# Patient Record
Sex: FEMALE | Race: WHITE | Employment: FULL TIME | URBAN - METROPOLITAN AREA
[De-identification: names, ages, dates, MRNs, and addresses within clinical notes are randomized per-mention and may not be internally consistent; named-entity substitution may affect disease eponyms.]

---

## 2022-12-29 ENCOUNTER — HOSPITAL ENCOUNTER (EMERGENCY)
Age: 27
Discharge: HOME | End: 2022-12-29
Attending: EMERGENCY MEDICINE
Payer: OTHER GOVERNMENT

## 2022-12-29 VITALS
SYSTOLIC BLOOD PRESSURE: 137 MMHG | RESPIRATION RATE: 12 BRPM | WEIGHT: 160 LBS | OXYGEN SATURATION: 98 % | HEIGHT: 66 IN | TEMPERATURE: 102.8 F | BODY MASS INDEX: 25.71 KG/M2 | DIASTOLIC BLOOD PRESSURE: 84 MMHG | HEART RATE: 100 BPM

## 2022-12-29 DIAGNOSIS — R59.1 LYMPHADENOPATHY: ICD-10-CM

## 2022-12-29 DIAGNOSIS — R50.9 FEVER, UNSPECIFIED FEVER CAUSE: Primary | ICD-10-CM

## 2022-12-29 LAB
FLU A ANTIGEN: NEGATIVE
FLU B ANTIGEN: NEGATIVE
MONONUCLEOSIS SCREEN: NEGATIVE

## 2022-12-29 PROCEDURE — 6360000002 HC RX W HCPCS: Performed by: EMERGENCY MEDICINE

## 2022-12-29 PROCEDURE — 99283 EMERGENCY DEPT VISIT LOW MDM: CPT

## 2022-12-29 PROCEDURE — 87804 INFLUENZA ASSAY W/OPTIC: CPT

## 2022-12-29 PROCEDURE — 6370000000 HC RX 637 (ALT 250 FOR IP): Performed by: EMERGENCY MEDICINE

## 2022-12-29 PROCEDURE — 86308 HETEROPHILE ANTIBODY SCREEN: CPT

## 2022-12-29 RX ORDER — DEXAMETHASONE SODIUM PHOSPHATE 10 MG/ML
10 INJECTION, SOLUTION INTRAMUSCULAR; INTRAVENOUS ONCE
Status: COMPLETED | OUTPATIENT
Start: 2022-12-29 | End: 2022-12-29

## 2022-12-29 RX ORDER — IBUPROFEN 800 MG/1
800 TABLET ORAL ONCE
Status: COMPLETED | OUTPATIENT
Start: 2022-12-29 | End: 2022-12-29

## 2022-12-29 RX ADMIN — IBUPROFEN 800 MG: 800 TABLET, FILM COATED ORAL at 02:38

## 2022-12-29 RX ADMIN — DEXAMETHASONE SODIUM PHOSPHATE 10 MG: 10 INJECTION, SOLUTION INTRAMUSCULAR; INTRAVENOUS at 03:25

## 2022-12-29 ASSESSMENT — ENCOUNTER SYMPTOMS
COLOR CHANGE: 0
CHEST TIGHTNESS: 0
SHORTNESS OF BREATH: 0
CONSTIPATION: 0
VOMITING: 0
SINUS PAIN: 0
DIARRHEA: 0
EYES NEGATIVE: 1
APNEA: 0
SORE THROAT: 0
ABDOMINAL DISTENTION: 0

## 2022-12-29 ASSESSMENT — PAIN - FUNCTIONAL ASSESSMENT: PAIN_FUNCTIONAL_ASSESSMENT: 0-10

## 2022-12-29 ASSESSMENT — PAIN DESCRIPTION - FREQUENCY: FREQUENCY: INTERMITTENT

## 2022-12-29 ASSESSMENT — PAIN DESCRIPTION - PAIN TYPE: TYPE: ACUTE PAIN

## 2022-12-29 ASSESSMENT — PAIN DESCRIPTION - DESCRIPTORS: DESCRIPTORS: ACHING

## 2022-12-29 ASSESSMENT — PAIN DESCRIPTION - LOCATION: LOCATION: GENERALIZED

## 2022-12-29 NOTE — ED PROVIDER NOTES
EMERGENCY DEPARTMENT ENCOUNTER    Pt Name: Esther Tamez  MRN: [id number]  [place] 1995  Date of evaluation: 12/29/22  CHIEF COMPLAINT       Chief Complaint   Patient presents with    Fever     C/o fever, swollen lymph nodes, severe headache, sore throat x3 days, at home covid negative, hx mono and strep, last dose Tylenol 9pm last night    Lymphadenopathy    Headache     HISTORY OF PRESENT ILLNESS   76-year-old female presents emergency room for swollen lymph nodes generalized malaise fever and headache. Symptoms have been going on for 3 days. Patient reports that she does not have a sore throat. She does have large tonsils. She has had mono more than once. No cough. No nausea or vomiting. REVIEW OF SYSTEMS     Review of Systems   Constitutional:  Positive for activity change, chills, fatigue and fever. Negative for diaphoresis. HENT:  Negative for congestion, sinus pain, sore throat and tinnitus. Eyes: Negative. Respiratory:  Negative for apnea, chest tightness and shortness of breath. Gastrointestinal:  Negative for abdominal distention, constipation, diarrhea and vomiting. Genitourinary:  Negative for difficulty urinating and frequency. Musculoskeletal:  Negative for arthralgias and myalgias. Skin:  Negative for color change and rash. Neurological:  Negative for dizziness. Hematological: Negative. Psychiatric/Behavioral: Negative. PASTMEDICAL HISTORY     Past Medical History:   Diagnosis Date    Mononucleosis     Strep throat      Past Problem List  There is no problem list on file for this patient. SURGICAL HISTORY     History reviewed. No pertinent surgical history. CURRENT MEDICATIONS       Previous Medications    No medications on file     ALLERGIES     has No Known Allergies. FAMILY HISTORY     has no family status information on file.       SOCIAL HISTORY       Social History     Tobacco Use    Smoking status: Never   Substance Use Topics    Alcohol use: Yes    Drug use: No     PHYSICAL EXAM     INITIAL VITALS: /84   Pulse 100   Temp (!) 102.8 °F (39.3 °C) (Oral)   Resp 12   Ht 5' 6\" (1.676 m)   Wt 160 lb (72.6 kg)   SpO2 98%   BMI 25.82 kg/m²    Physical Exam  Constitutional:       General: She is not in acute distress. Appearance: She is well-developed. HENT:      Head: Normocephalic. Mouth/Throat:      Comments: Patient does have enlarged tonsils however they do not appear erythematous swollen or have exudate  Eyes:      Pupils: Pupils are equal, round, and reactive to light. Cardiovascular:      Rate and Rhythm: Normal rate and regular rhythm. Heart sounds: Normal heart sounds. Pulmonary:      Effort: Pulmonary effort is normal. No respiratory distress. Breath sounds: Normal breath sounds. Abdominal:      General: Bowel sounds are normal.      Palpations: Abdomen is soft. Tenderness: There is no abdominal tenderness. Musculoskeletal:         General: Normal range of motion. Lymphadenopathy:      Cervical: Cervical adenopathy present. Skin:     General: Skin is warm and dry. Neurological:      Mental Status: She is alert and oriented to person, place, and time. MEDICAL DECISION MAKING:     Alert oriented 76-year-old female presenting to the emergency room for fever lymphadenopathy and headache. Patient is febrile here in the ED. Her exam is otherwise benign. She does have chronically swollen tonsils per her history. Rapid flu is negative. Mononucleosis screen was completed prior to discharge. Patient given steroids to help with swelling.     CRITICAL CARE:       PROCEDURES:    Procedures    DIAGNOSTIC RESULTS   EKG:All EKG's are interpreted by the Emergency Department Physician who either signs or Co-signs this chart in the absence of a cardiologist.        RADIOLOGY:All plain film, CT, MRI, and formal ultrasound images (except ED bedside ultrasound) are read by the radiologist, see reports below, unless otherwisenoted in MDM or here. No orders to display     LABS: All lab results were reviewed by myself, and all abnormals are listed below. Labs Reviewed   RAPID INFLUENZA A/B ANTIGENS   MONONUCLEOSIS SCREEN       EMERGENCY DEPARTMENTCOURSE:         Vitals:    Vitals:    12/29/22 0207   BP: 137/84   Pulse: 100   Resp: 12   Temp: (!) 102.8 °F (39.3 °C)   TempSrc: Oral   SpO2: 98%   Weight: 160 lb (72.6 kg)   Height: 5' 6\" (1.676 m)       The patient was given the following medications while in the emergency department:  Orders Placed This Encounter   Medications    ibuprofen (ADVIL;MOTRIN) tablet 800 mg    dexamethasone (PF) (DECADRON) injection 10 mg     CONSULTS:  None    FINAL IMPRESSION      1. Fever, unspecified fever cause    2. Lymphadenopathy          DISPOSITION/PLAN   DISPOSITION Decision To Discharge 12/29/2022 03:16:22 AM      PATIENT REFERRED TO:  Salvador Tipton MD  [address] [place] [address]  [phone]    Schedule an appointment as soon as possible for a visit   As needed    DISCHARGE MEDICATIONS:  New Prescriptions    No medications on file     Georgina Veloz MD  Attending Emergency Physician      Care during this encounter was due to an unprecedented national emergency due to COVID-19.              Alfonso Womack MD  12/29/22 4624

## 2022-12-29 NOTE — DISCHARGE INSTRUCTIONS
The mononucleosis screen can take up to 2 hours to result. The steroids that were given here will likely help with some of the swelling in the back of the throat. I recommend Motrin at home. You may return to the emergency room at any time if symptoms worsen.

## 2022-12-29 NOTE — ED NOTES
Pt presents with c/o fever and headache. States she has had her symptoms for 3 days. Took a COVID test at home that was negative. Has a history of mono. Lymph nodes appear swollen. Has been taking Tylenol at home, last dose was 2100.       Chica Castillo RN  12/29/22 9250